# Patient Record
Sex: FEMALE | Race: OTHER | URBAN - METROPOLITAN AREA
[De-identification: names, ages, dates, MRNs, and addresses within clinical notes are randomized per-mention and may not be internally consistent; named-entity substitution may affect disease eponyms.]

---

## 2020-12-23 ENCOUNTER — OFFICE VISIT (OUTPATIENT)
Dept: FAMILY MEDICINE CLINIC | Facility: CLINIC | Age: 45
End: 2020-12-23
Payer: COMMERCIAL

## 2020-12-23 VITALS
DIASTOLIC BLOOD PRESSURE: 80 MMHG | HEART RATE: 65 BPM | HEIGHT: 66 IN | TEMPERATURE: 98 F | OXYGEN SATURATION: 99 % | RESPIRATION RATE: 16 BRPM | BODY MASS INDEX: 25.88 KG/M2 | SYSTOLIC BLOOD PRESSURE: 131 MMHG | WEIGHT: 161 LBS

## 2020-12-23 DIAGNOSIS — Z20.822 ENCOUNTER FOR SCREENING LABORATORY TESTING FOR COVID-19 VIRUS: Primary | ICD-10-CM

## 2020-12-23 PROCEDURE — 3079F DIAST BP 80-89 MM HG: CPT | Performed by: NURSE PRACTITIONER

## 2020-12-23 PROCEDURE — 3008F BODY MASS INDEX DOCD: CPT | Performed by: NURSE PRACTITIONER

## 2020-12-23 PROCEDURE — 99202 OFFICE O/P NEW SF 15 MIN: CPT | Performed by: NURSE PRACTITIONER

## 2020-12-23 PROCEDURE — 3075F SYST BP GE 130 - 139MM HG: CPT | Performed by: NURSE PRACTITIONER

## 2020-12-23 RX ORDER — ALBUTEROL SULFATE 90 UG/1
AEROSOL, METERED RESPIRATORY (INHALATION)
COMMUNITY
Start: 2020-07-06

## 2020-12-23 RX ORDER — IBUPROFEN 800 MG/1
800 TABLET ORAL EVERY 6 HOURS PRN
COMMUNITY
Start: 2020-12-15

## 2020-12-23 RX ORDER — HYDROCODONE BITARTRATE AND ACETAMINOPHEN 5; 325 MG/1; MG/1
1 TABLET ORAL
COMMUNITY
Start: 2020-12-15

## 2020-12-23 RX ORDER — AMOXICILLIN 500 MG/1
TABLET, FILM COATED ORAL
COMMUNITY
Start: 2020-12-15

## 2020-12-23 RX ORDER — LORAZEPAM 0.5 MG/1
0.5 TABLET ORAL 3 TIMES DAILY PRN
COMMUNITY
Start: 2020-12-12

## 2020-12-23 NOTE — PROGRESS NOTES
CHIEF COMPLAINT:   Patient presents with:  Covid-19 Test: exposure      HPI:   Jayesh Batista is a 50year old female who presents for Covid 19 testing. No exposure but  was exposed at work.    At this time, not experiencing upper respiratory symptom clear  EARS: TM's clear grey, no bulging, no retraction, no fluid, bony landmarks visualized  NOSE: Nostrils patent, no nasal discharge, nasal mucosa pink   THROAT: Oral mucosa pink, moist. Posterior pharynx is not erythematous. no exudates. Tonsils 0/4. Health is here to provide community members reliable answers to any questions they may have. Please review the entirety of this informational document.   It includes information related to exposure, pending tests, positive results, aftercare, and plasma sneezes. 7. Wash your hands often with soap and water for at least 20 seconds or clean your hands with an alcohol-based hand  that contains at least 60% alcohol.    8. As much as possible, stay in a specific room and away from other people in you immunocompromised.   If you have a fever with cough or shortness of breath but have not been exposed to someone with COVID-19 and have not tested positive for COVID-19, you should also stay home and away from others for a total of 10 days after your symptom You can also get more information at the following websites:   Centers for Disease Control & Prevention (CDC)  What to do if you are sick with coronavirus disease 2019, [email or web address]

## 2020-12-23 NOTE — PATIENT INSTRUCTIONS
• Covid hotline number is [phone]  • Results for covid testing can vary from 5-7 days  • Notify your employer or school of testing  • Remember if you tested negative but were exposed to covid you should quarantine for 14 days even if you did not devel will be called to you from an [facility] representative. If you have not received a call within 2 business days, please call your primary care provider or check [place] for results. 10 Ways to Manage Your Health at Home      1.  Stay home from work, steps.    If you have not been exposed or are not aware of an exposure to COVID-19 and are concerned about your symptoms, please contact your health care provider with any questions.     Home Isolation  If you have tested positive for COVID-19, you should r blood research institute in the [place] and one of [employer]’s blood product suppliers) is coordinating plasma donations.     If you would be interested in donating your plasma to help treat others diagnosed with the virus, please contact Ana Rosa caldera

## 2022-12-01 ENCOUNTER — OFFICE VISIT (OUTPATIENT)
Dept: FAMILY MEDICINE CLINIC | Facility: CLINIC | Age: 47
End: 2022-12-01
Payer: COMMERCIAL

## 2022-12-01 VITALS
OXYGEN SATURATION: 98 % | BODY MASS INDEX: 27.16 KG/M2 | HEIGHT: 66 IN | TEMPERATURE: 98 F | WEIGHT: 169 LBS | SYSTOLIC BLOOD PRESSURE: 116 MMHG | HEART RATE: 67 BPM | RESPIRATION RATE: 18 BRPM | DIASTOLIC BLOOD PRESSURE: 75 MMHG

## 2022-12-01 DIAGNOSIS — R05.1 ACUTE COUGH: Primary | ICD-10-CM

## 2022-12-01 DIAGNOSIS — U07.1 COVID-19: ICD-10-CM

## 2022-12-01 LAB
OPERATOR ID: ABNORMAL
POCT LOT NUMBER: ABNORMAL
RAPID SARS-COV-2 BY PCR: DETECTED

## 2022-12-01 PROCEDURE — 3008F BODY MASS INDEX DOCD: CPT | Performed by: NURSE PRACTITIONER

## 2022-12-01 PROCEDURE — 3074F SYST BP LT 130 MM HG: CPT | Performed by: NURSE PRACTITIONER

## 2022-12-01 PROCEDURE — 99213 OFFICE O/P EST LOW 20 MIN: CPT | Performed by: NURSE PRACTITIONER

## 2022-12-01 PROCEDURE — 3078F DIAST BP <80 MM HG: CPT | Performed by: NURSE PRACTITIONER

## 2022-12-01 PROCEDURE — U0002 COVID-19 LAB TEST NON-CDC: HCPCS | Performed by: NURSE PRACTITIONER

## 2022-12-01 RX ORDER — BUSPIRONE HYDROCHLORIDE 15 MG/1
1 TABLET ORAL 3 TIMES DAILY
COMMUNITY
Start: 2022-07-14

## 2022-12-01 RX ORDER — CETIRIZINE HYDROCHLORIDE 10 MG/1
1 TABLET ORAL DAILY
COMMUNITY
Start: 2021-04-28